# Patient Record
(demographics unavailable — no encounter records)

---

## 2024-12-11 NOTE — HISTORY OF PRESENT ILLNESS
[FreeTextEntry1] : 2/11/22: 66 yo female PMHx HTN, HLD, PAD with severe right leg claudication and early right leg rest pain. Patient had bilateral LE angiograms with intervention recently. She is still unable to walk more than a few feet without severe pain in right leg. Over the past few days she has had significant pain in her right 1st toe. She denies any open wounds or discoloration of the toe.  Pt has been taking Pletal and has slightly decreased pain in her leg.   3/4/22: Patient is doing well since the bypass. She has some leg swelling. She has been complaint with medications. She has less leg pain when walking.   4/1/22: Pt still has some swelling in her right lower leg. She has occasional pain in her thigh since surgery. She occasionally also gets an electrical type pain from her foot the the hip. She has been compliant with ASA, Plavix and Pletal. She still has some claudication in her left leg. She notices it more since surgery because her right leg is less painful.   4/15/22: Pt is still having left leg pain and claudication. She has less pain and swelling in her right leg since her bypass. She has been compliant with ASA and Plavix. She has been having easy bruising. She would like to schedule a screening colonoscopy but was told she cannot schedule until she can stop Plavix. Pt denies any fever or chills.   6/24/22: Pt is doing well since her angiogram. She has less leg pain when walking.  She has been compliant with ASA and Plavix. She has been having easy bruising. She has been complaint with medications. Pt denies any fever or chills.   11/4/22:  Pt is doing well since her angiogram. She has less leg pain when walking.  She has been compliant with ASA and Plavix. She has been having easy bruising. She has been complaint with medications. Pt denies any fever or chills.   3/10/23:   Pt is doing well since her angiogram. She has less leg pain when walking.  She has been compliant with ASA and Plavix. She has been complaint with medications. Pt denies any fever or chills.   4/21/23: Pt is planning to have a cervical discectomy and needs vascular clearance prior to surgery. She has less leg pain when walking.  She has been compliant with ASA and Plavix. She has been complaint with medications. Pt denies any fever or chills.   9/15/23:   Pt is doing well since last visit. She has less leg pain when walking.  She has been compliant with ASA and Plavix. She has been complaint with medications. Pt denies any fever or chills.   12/13/23:   Pt is doing well since last visit. She has less leg pain when walking.  She has been compliant with ASA and Plavix. She has been complaint with medications. Pt denies any fever or chills.   6/12/24: Pt is doing well since last visit. She has less leg pain when walking.  She has been compliant with ASA and Plavix. She has been complaint with medications. Pt denies any fever or chills.   12/11/24: Pt is doing well since last visit. She has less leg pain when walking.  She has been compliant with ASA and Plavix. She has been complaint with medications. Pt denies any fever or chills.   PSHx: 6/2/22 LLE angiogram with SFA POB  2/24/22 right femoral to popliteal bypass  4/2021 right leg angiogram with SFA stent  2/2021 Left leg angiogram with angioplasty

## 2024-12-11 NOTE — ASSESSMENT
[Arterial/Venous Disease] : arterial/venous disease [FreeTextEntry1] : 67 yo female with PAD. She is s/p right femoral to popliteal bypass and left SFA stent. Bypass patent on prior duplex. Left SFA occluded. KULDEEP/PVR today is right 1.09 left 0.70 No changes in symptoms since last visit.  Pt counseled again on results of KULDEEP/PVR and above diagnosis. Pt counseled on importance of compliance with Plavix, and atorvastatin. She can stop aspirin  Walk 30 minutes per day RTC in 6 months for KULDEEP/PVR  A total of 20 minutes was spent with patient and coordinating care.

## 2024-12-11 NOTE — ADDENDUM
[FreeTextEntry1] : patient is cleared from a vascular standpoint to stop ASA and Plavix 5 days prior to colonoscopy. Can resume the next day.

## 2024-12-11 NOTE — PROCEDURE
[FreeTextEntry1] : 11/4/22 BLE arterial duplex:  right: patent femoral to popliteal bypass. No flow restrictive lesions  left: SFA stent occluded   3/10/23 KULDEEP/PVR: right 0.99, left 0.53  12/13/23 KULDEEP/PVR: right 1.14, left 0.71  6/12/24 KULDEEP/PVR: right 0.97, left 0.64  12/11/24 KULDEEP/PVR: right 1.09, left 0.70

## 2024-12-11 NOTE — PHYSICAL EXAM
[Normal Breath Sounds] : Normal breath sounds [Normal Heart Sounds] : normal heart sounds [2+] : right 2+ [0] : left 0 [Ankle Swelling (On Exam)] : present [Ankle Swelling On The Right] : of the right ankle [Alert] : alert [Oriented to Person] : oriented to person [Oriented to Place] : oriented to place [Oriented to Time] : oriented to time [Calm] : calm [JVD] : no jugular venous distention  [Varicose Veins Of Lower Extremities] : not present [] : not present [Skin Ulcer] : no ulcer [de-identified] : A & O x 3  [de-identified] : no open wounds on feet

## 2025-01-23 NOTE — HISTORY OF PRESENT ILLNESS
[Former] : Former [TextBox_13] : Ms. JIMENEZ is a 68 year old female with a history of PAD, HTN and high cholesterol.  Reviewed and confirmed that the patient meets screening eligibility criteria:  68 years old   Smoking Status: Former smoker  Number of pack(s) per day: 1 Number of years smoked: 30 Number of pack years smokin Quit year:   No symptoms of lung cancer, including new cough, change in cough, hemoptysis, and unintentional weight loss.  No personal history of lung cancer.  No lung cancer in a first degree relative.  No history of lung disease or occupational exposures. [YearQuit] : 2019 [PacksperDay] : 1 [N_Years] : 30 [PacksperYear] : 30

## 2025-02-26 NOTE — PROCEDURE
[FreeTextEntry1] : [place] done today 7/26/24: normal   PFT done 4/14/23: no obstruction. lung volumes normal. DLCO normal.  --------- EXAM: [id number] - CT LDCT LUNG CA SCREENING - ORDERED BY: JIM STRONG   PROCEDURE DATE: 02/12/2025    INTERPRETATION: SMOKING HISTORY: 68 year old with 30 pack year smoking history; quit smoking 2019  TECHNIQUE: Low-dose CT scan of the chest was obtained without administration of intravenous contrast.  COMPARISON: Chest CT 2/16/2024  FINDINGS:  LUNG NODULES: Tiny left lower lobe calcified granuloma. No new nodules.  LUNG PARENCHYMA/AIRWAYS/PLEURA: The central airways are patent. No pleural effusion. Unchanged linear atelectasis/scarring in the medial right middle lobe.  MEDIASTINUM/LYMPH NODES: No thoracic lymphadenopathy.  HEART AND GREAT VESSELS: The heart is normal in size. There is no pericardial effusion. The great vessels are normal in size. Coronary artery calcification.  UPPER ABDOMEN: The low dose technique limits diagnostic evaluation of the organs of the upper abdomen: Stable small left renal cyst.  BONES/SOFT TISSUES: ACDF.  IMPRESSION: Stable exam. No noncalcified nodules.  SCREENING RECOMMENDATIONS: 12-month screening LDCT, if the patient continues to meet eligibility criteria  LungRADS category: 1 Negative  Reported using the American College of Radiology Lung Imaging Reporting and Data System (Lung-RADS) version 2022  --- End of Report ---       RHIANNON DECKER M.D., Attending Radiologist  ------- Date of Exam: 02-   EXAM:  CT CHEST WITHOUT CONTRAST  Note - This patient has received 0 CT studies and 0 Myocardial Perfusion studies within our network over the previous 12 month period.  HISTORY:  Persistent cough and bronchitis  TECHNIQUE: CT of the chest is performed.  Contrast Technique: Without  Range/Planes: [place] of lungs to the adrenal glands. Axial, coronal, and sagittal.  One or more of the following dose reduction techniques were used: automated exposure control, adjustment of the mA and/or kV according to patient size, use of iterative reconstruction technique.   COMPARISON:  CT scans of the chest dated 5/3/2022 and 9/24/2019.l  FINDINGS: CHEST  THYROID: Stable comparable contours of the thyroid. No suspicious lymph nodes in the lower neck.  LUNGS: A punctate nodule in the peripheral right middle lobe on series 2/image 137 and a punctate nodule in the lateral left lung base on image 199 are likely stable taking into account respiratory motion. A punctate nodule in the anterior medial left upper lobe on image 77 was not definitely present on the prior examinations. There is no suspiciously enlarged or abnormal appearing nodule in the rest of the lung fields.  No significant emphysematous or interstitial fibrotic lung disease.  TRACHEA AND BRONCHI: No central endobronchial or endotracheal soft tissue mass.  PLEURA: No pleural effusion.  HEART AND PERICARDIUM: Unchanged in size. No pericardial effusion.  VASCULATURE: No aneurysm dilatation.  LYMPH NODES AND MEDIASTINUM: No suspicious lymph nodes in the chest. No mediastinal mass.  SOFT TISSUES: No chest wall mass lesion.  BONES: Progressing degenerative changes especially in the midthoracic spine.  FINDINGS: VISUALIZED PORTION OF THE ABDOMEN   GASTROESOPHAGEAL JUNCTION: Unchanged.  OTHER ORGANS: Stable comparable contours of the solid organs.  IMPRESSION:    1. Minimal progressing architectural changes. Some of currently seen punctate nodules were present dating back to 2019, however, at least one punctate nodule in the anterior left upper lobe was not definitely seen. Given their size and appearance, a benign etiology is favored. If clinically amenable, consider routine annual screening.  2. No suspicious lymph nodes in the chest.   DLP: 89 mGy*cm  Thank you for the opportunity to participate in the care of this patient.     Chris Salinas MD  - Electronically Signed: 02- 3:20 PM  Physician to Physician Direct Line is: [phone]

## 2025-02-26 NOTE — HISTORY OF PRESENT ILLNESS
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  Doing well on APAP 6-16. Got current machine 12/18/21. Compliance excellent. Therapeutic AHI WNL. Fels better using it. Has to skip some nights with URI.   Right now, no sob, wheeze, cough. Feels well.  Does not have an albuterol now.  [place] done 7/26/24: normal   Quit smoking in 2019. [TextBox_100] : 3/27/22 [TextBox_108] : 123 [TextBox_104] : 5/8/22 [TextBox_125] : 6-16 [TextBox_137] : 70 [TextBox_147] : 2.4

## 2025-03-28 NOTE — REASON FOR VISIT
[Follow-Up Visit] : a follow-up visit for [Back Pain] : back pain [FreeTextEntry2] : s/p: cervical spinal fusion C3-C6. DOS: 05/31/2023

## 2025-03-28 NOTE — HISTORY OF PRESENT ILLNESS
[de-identified] : 69-year-old female is here for neck and back pain.  She is status post cervical fusion C3-C6 on May 1, 2023.  She has slight discomfort at the base of her neck intermittently/muscle spasm type quality when she is particularly active but is not having any weakness of the arms or legs.  Her balance is much better after hiring a  and swimming  once to twice a week for the last several months.  She also has intermittent tingling in the first and second digits on the right hand intermittently worse with being on the phone or driving which I think is more related to carpal tunnel syndrome.  She saw her neurologist yesterday who diagnosed her with carpal tunnel syndrome as well as osteoarthritis of the lumbar spine.  She has low back pain upon week, but it gets better with as the day progresses.  No weakness numbness or tingling of the legs.

## 2025-03-28 NOTE — DISCUSSION/SUMMARY
[de-identified] : 30 minutes was spent reviewing the x-rays as well as discussing with the patient their clinical presentation, diagnosis and providing education.  Conservative treatment was discussed with the patient at length. Anticipatory guidance regarding disease process status post ACDF, cervical myositis and, avoidance of acute exacerbation this was discussed at length and all patients commenting concerns were answered to the patient's satisfaction. Physical therapy for decrease pain and increase function was ordered. Patient was given home exercises as approved by [employer] and works well held directed toward this particular process. Intermittent use of acetaminophen 500 mg 2 tablets t.i.d. p.r.n. mild to moderate pain, ibuprofen 600 mg t.i.d. p.r.n. severe pain with food or milk. Home exercise including stretching on a daily basis for 20-30 minutes was recommended. Heat, ice, topical were discussed as needed. The patient will followup in 6 months or as needed at which point in time if symptoms continue we will order lumbar MRI studies to guide treatment plan including possible injection therapy with pain management versus surgical option.   Regarding carpal tunnel/medial cubital tunnel syndrome, if patient would like we can order PT OT in the future.  She will do home exercises for the time being.  We can also refer her to hand upper extremity specialty as needed.

## 2025-03-28 NOTE — PHYSICAL EXAM
[de-identified] : CONSTITUTIONAL:  Patient is a very pleasant individual who is well-nourished and appears stated age.  PSYCHIATRIC:  Alert and oriented times three and in no apparent distress, and participates with orthopedic evaluation well. HEAD:  Atraumatic and  nonsyndromic in appearance. EENT: No thyromegaly, EOMI. RESPIRATORY:  Respiratory rate is regular, not dyspneic on examination. LYMPHATICS:  There is no cervical or axillary lymphadenopathy. INTEGUMENTARY:  Skin is clean, dry, and intact about the bilateral upper extremities and cervical spine.  Well-healed anterior cervical incision VASCULAR:   There is brisk capillary refill about the bilateral upper extremities and radial pulses are 2/4.  NEUROLOGIC:  Negative L'hirmitte, negative Spurling's sign. There are no pathologic reflexes. There is no decrease in sensation of the bilateral upper extremities on manual examination.  Deep tendon reflexes are well-maintained at +2/4 of the bilateral upper extremities and are symmetric.  Positive Phalen Tinel sign right greater than left positive numbness on percussion of ulnar nerve right greater than left. MUSCULOSKELETAL:  There is no visible muscular atrophy.  Manual motor strength is well maintained in the bilateral upper extremities.  Cervical range of motion is well maintained.  The patient ambulates in a non-myelopathic manner. Normal secondary orthopaedic exam of bilateral shoulders, elbows and hands.  Elbow flexion and extension, wrist extension, finger flexion and abduction are well maintained.  There is mild pain on forward flexion of the lumbar spine with mild trapezius deltoid myositis.    [de-identified] : X-rays of the cervical spine have been reviewed on today's date that shows status post 3 level ACDF well aligned when compared to x-rays from May 24 and stable.  Have also been reviewed shows loss of disc height at L3-L4 lumbar lordosis is maintained gentle sidebending to the left orientation

## 2025-05-02 NOTE — PHYSICAL EXAM
[FreeTextEntry1] : normal peripheral circulation  [Normal Appearance] : normal appearance [Well Groomed] : well groomed [General Appearance - In No Acute Distress] : no acute distress [] : no respiratory distress [Respiration, Rhythm And Depth] : normal respiratory rhythm and effort [Exaggerated Use Of Accessory Muscles For Inspiration] : no accessory muscle use [Abdomen Soft] : soft [Abdomen Tenderness] : non-tender [Urinary Bladder Findings] : the bladder was normal on palpation [Normal Station and Gait] : the gait and station were normal for the patient's age [No Focal Deficits] : no focal deficits [Oriented To Time, Place, And Person] : oriented to person, place, and time [Affect] : the affect was normal [Mood] : the mood was normal

## 2025-05-02 NOTE — ASSESSMENT
[FreeTextEntry1] : Reviewed Records. Discussed labs and imaging. Reviewed records. Patient had CT scan of the abdomen and pelvis at [facility]. Shows bilateral renal cysts. On review of images, renal cysts appeared unchanged.    Renal cyst: Patient will get renal and bladder ultrasound in 1 year. Will inform results.     Return to office in 1 year or sooner if any issues.

## 2025-05-02 NOTE — LETTER BODY
[Dear  ___] : Dear  [unfilled], [Courtesy Letter:] : I had the pleasure of seeing your patient, [unfilled], in my office today. [Please see my note below.] : Please see my note below. [Sincerely,] : Sincerely, [FreeTextEntry3] : Kg Garcia MD\par   of Urology\par  [facility]\par  \par  [facility]\par  Offices:\par  [address] [facility], [facility]\par  [address] [address]\par  [address] [facility], [address]\par  \par  TEL: [phone]\par  FAX: [phone]

## 2025-05-02 NOTE — HISTORY OF PRESENT ILLNESS
[FreeTextEntry1] : Primary care physician: Dr Abbey Azul, [facility].   Follow my health: Active user.   05/02/2025: 69-year-old female presents for follow up.  Repeat RBUS 11/04/24 again showed bilateral simple cyst. Repeat CT 05/01/2025 again showed bilateral simple renal cysts. No bother with urination.  Denies dysuria, hematuria, lower abdominal or flank pain, fever, chills or rigors. Denies hesitancy, straining, intermittency, urgency, incontinence or sense of incomplete emptying.  05/01/2024:  68-year-old female presents for follow-up In March, patient fell down and went to [facility]. Had a pan-scan.   Since then, has been having back pain, especially on movement. No bother with urination. No other imaging was done.   Seen on 3/8/23 for renal cyst. Had been having right upper quadrant pain, had Abdominal Ultrasound: right renal cyst.  Also complained of right back pain. Has Spine issues. Daytime frequency is 2 to 3 x or so. Nocturia of 3 x.  No urinary incontinence.  Denied dysuria, hematuria, fever, chills or rigors. No personal history or family history of kidney stone.   Mother required Dialysis.   Past smoker. 1 PPD >30 years. Quit 2019.   Seen in 2019 for Microhematuria. Had negative work up including Cystoscopy.

## 2025-05-02 NOTE — ADDENDUM
[FreeTextEntry1] :  Sarah KATE assisted in documentation on 05/01/2024  acting as a scribe for Dr. Kg Garcia.

## 2025-05-02 NOTE — END OF VISIT
[Time Spent: ___ minutes] : I have spent [unfilled] minutes of time on the encounter which excludes teaching and separately reported services. [FreeTextEntry3] : Patient was seen with Nurse Practitioner and examined by me. Agree with above note, where necessary edits were made.

## 2025-05-28 NOTE — HISTORY OF PRESENT ILLNESS
[FreeTextEntry1] : Leticia is a very pleasant 69-year-old female presenting today with chronic history of wrist and hand discomfort.  Describes burning and numbness and tingling.

## 2025-05-28 NOTE — REASON FOR VISIT
[Initial Visit] : an initial visit for [Wrist Pain] : wrist pain [Carpal Tunnel Syndrome] : Carpal Tunnel Syndrome

## 2025-05-28 NOTE — ASSESSMENT
[FreeTextEntry1] : ASSESSMENT: The patient comes in today with chronic exacerbated complaints of left wrist and hand discomfort.  We have discussed carpal tunnel symptoms.  We have discussed bracing activity modification.  At this stage patient elects for injection which should be helpful in a therapeutic and diagnostic manner   The patient was adequately and thoroughly informed of my assessment of their current condition(s).  - This may diminish bodily function for the extremity. We discussed prognosis, tx modalities including operative and nonoperative options for the above diagnostic assessment. As always, 2nd opinion is always provided as an option.  When accessible, I was able to review other physicians note(s) including reviewing other tests, imaging results as well as personally view these results for my own interpretation.   Injection procedure for left carpal tunnel:   The risks and benefits of a steroid injection were discussed in detail. The risks include but are not limited to: pain, infection, swelling, flare response, bleeding, subcutaneous fat atrophy, skin depigmentation and/or elevation of blood sugar. The risk of incomplete resolution of symptoms, recurrence and additional intervention was reviewed and considered by the patient. The patient agreed to proceed and under a sterile prep, I injected 1 unit 6mg into 1 cc of a combination of Celestone and Lidocaine into the above stated location for the procedure. The patient tolerated the injection well. The patient was adequately and thoroughly informed of my assessment of their current condition(s).  DISCUSSION: 1.  Injection as above.  Bracing activity modification.  Follow-up in 6 weeks 2. [x] 3. [x]

## 2025-05-28 NOTE — PHYSICAL EXAM
[de-identified] : Exam [left] wrist + Durkan's with + N/T. There is + tinel. Patient is able to delineate numbness to median-sided digits volarly. [No obvious thenar atrophy] [de-identified] : [4] views of [bilateral hands and wrists] were obtained today in my office and were seen by me and discussed with the patient.  These [show findings consistent with bilateral basal joint OA and findings of IP joint OA]

## 2025-06-11 NOTE — PROCEDURE
[FreeTextEntry1] : 11/4/22 BLE arterial duplex:  right: patent femoral to popliteal bypass. No flow restrictive lesions  left: SFA stent occluded   3/10/23 KULDEEP/PVR: right 0.99, left 0.53  12/13/23 KULDEEP/PVR: right 1.14, left 0.71  6/12/24 KULDEEP/PVR: right 0.97, left 0.64  12/11/24 KULDEEP/PVR: right 1.09, left 0.70  6/11/25 KULDEEP/PVR: right 0.99, left 0.69

## 2025-06-11 NOTE — HISTORY OF PRESENT ILLNESS
[FreeTextEntry1] : 2/11/22: 64 yo female PMHx HTN, HLD, PAD with severe right leg claudication and early right leg rest pain. Patient had bilateral LE angiograms with intervention recently. She is still unable to walk more than a few feet without severe pain in right leg. Over the past few days she has had significant pain in her right 1st toe. She denies any open wounds or discoloration of the toe.  Pt has been taking Pletal and has slightly decreased pain in her leg.   3/4/22: Patient is doing well since the bypass. She has some leg swelling. She has been complaint with medications. She has less leg pain when walking.   4/1/22: Pt still has some swelling in her right lower leg. She has occasional pain in her thigh since surgery. She occasionally also gets an electrical type pain from her foot the the hip. She has been compliant with ASA, Plavix and Pletal. She still has some claudication in her left leg. She notices it more since surgery because her right leg is less painful.   4/15/22: Pt is still having left leg pain and claudication. She has less pain and swelling in her right leg since her bypass. She has been compliant with ASA and Plavix. She has been having easy bruising. She would like to schedule a screening colonoscopy but was told she cannot schedule until she can stop Plavix. Pt denies any fever or chills.   6/24/22: Pt is doing well since her angiogram. She has less leg pain when walking.  She has been compliant with ASA and Plavix. She has been having easy bruising. She has been complaint with medications. Pt denies any fever or chills.   11/4/22:  Pt is doing well since her angiogram. She has less leg pain when walking.  She has been compliant with ASA and Plavix. She has been having easy bruising. She has been complaint with medications. Pt denies any fever or chills.   3/10/23:   Pt is doing well since her angiogram. She has less leg pain when walking.  She has been compliant with ASA and Plavix. She has been complaint with medications. Pt denies any fever or chills.   4/21/23: Pt is planning to have a cervical discectomy and needs vascular clearance prior to surgery. She has less leg pain when walking.  She has been compliant with ASA and Plavix. She has been complaint with medications. Pt denies any fever or chills.   9/15/23:   Pt is doing well since last visit. She has less leg pain when walking.  She has been compliant with ASA and Plavix. She has been complaint with medications. Pt denies any fever or chills.   12/13/23:   Pt is doing well since last visit. She has less leg pain when walking.  She has been compliant with ASA and Plavix. She has been complaint with medications. Pt denies any fever or chills.   6/12/24: Pt is doing well since last visit. She has less leg pain when walking.  She has been compliant with ASA and Plavix. She has been complaint with medications. Pt denies any fever or chills.   12/11/24: Pt is doing well since last visit. She has less leg pain when walking.  She has been compliant with ASA and Plavix. She has been complaint with medications. Pt denies any fever or chills.   6/11/25: Pt is doing well since last visit. She has less leg pain when walking.  She has been compliant with ASA and Plavix. She has been complaint with medications. Pt denies any fever or chills.   PSHx: 6/2/22 LLE angiogram with SFA POB  2/24/22 right femoral to popliteal bypass  4/2021 right leg angiogram with SFA stent  2/2021 Left leg angiogram with angioplasty

## 2025-06-11 NOTE — ASSESSMENT
[FreeTextEntry1] : 70 yo female with PAD. She is s/p right femoral to popliteal bypass and left SFA stent. Bypass patent on prior duplex. Left SFA occluded. KULDEEP/PVR today is right 0.99, left 0.69. No changes in symptoms since last visit.  Pt counseled on results of KULDEEP/PVR and above diagnosis. Pt counseled on importance of compliance with Plavix, and atorvastatin. She can stop aspirin  Walk 30 minutes per day RTC in 6 months for KULDEEP/PVR   [Arterial/Venous Disease] : arterial/venous disease

## 2025-06-11 NOTE — PHYSICAL EXAM
[JVD] : no jugular venous distention  [Normal Breath Sounds] : Normal breath sounds [Normal Heart Sounds] : normal heart sounds [0] : left 0 [2+] : right 2+ [Ankle Swelling (On Exam)] : present [Ankle Swelling On The Right] : of the right ankle [Varicose Veins Of Lower Extremities] : not present [] : not present [Skin Ulcer] : no ulcer [Alert] : alert [Oriented to Place] : oriented to place [Oriented to Person] : oriented to person [Oriented to Time] : oriented to time [Calm] : calm [de-identified] : A & O x 3  [de-identified] : no open wounds on feet

## 2025-07-09 NOTE — HISTORY OF PRESENT ILLNESS
[FreeTextEntry1] : Leticia is a very pleasant 69-year-old female presenting today with chronic history of wrist and hand discomfort.  Describes burning and numbness and tingling. Presents today recent nerve study.  She has history of cervical spine surgery.

## 2025-07-09 NOTE — PHYSICAL EXAM
[de-identified] : Patient is able to delineate numbness to median-sided digits volarly. [No obvious thenar atrophy] [de-identified] : [4] views of [bilateral hands and wrists] were reviewed today in my office and were seen by me and discussed with the patient.  These [show findings consistent with bilateral basal joint OA and findings of IP joint OA]

## 2025-07-09 NOTE — ASSESSMENT
[FreeTextEntry1] : ASSESSMENT: The patient comes in today with chronic exacerbated complaints of left wrist and hand discomfort.  We have discussed carpal tunnel symptoms.  We have discussed bracing activity modification.   Unfortunately the response to the injection is unclear as per patient.  At this juncture we have also thoroughly reviewed and reviewed the patient's nerve study findings.  The patient has findings related to cervical spine as well as per neurologist and findings carpal tunnel changes.  At this juncture the study alludes to bilateral carpal tunnel.  The patient has no findings on right side and denies numbness and tingling.  On the left side patient alludes to carpal tunnel type symptoms however response to the injection is unclear which leads to possibility of symptoms coming from the cervical spine unfortunately.  At this stage patient elects for injection once again.  If no improvement with injections I have advised patient that surgery is of limited benefit.  It may not lead to any change in her clinical status.  She verbalized complete understanding   The patient was adequately and thoroughly informed of my assessment of their current condition(s).  - This may diminish bodily function for the extremity. We discussed prognosis, tx modalities including operative and nonoperative options for the above diagnostic assessment. As always, 2nd opinion is always provided as an option.  When accessible, I was able to review other physicians note(s) including reviewing other tests, imaging results as well as personally view these results for my own interpretation.   Injection procedure for left carpal tunnel:   The risks and benefits of a steroid injection were discussed in detail. The risks include but are not limited to: pain, infection, swelling, flare response, bleeding, subcutaneous fat atrophy, skin depigmentation and/or elevation of blood sugar. The risk of incomplete resolution of symptoms, recurrence and additional intervention was reviewed and considered by the patient. The patient agreed to proceed and under a sterile prep, I injected 1 unit 6mg into 1 cc of a combination of Celestone and Lidocaine into the above stated location for the procedure. The patient tolerated the injection well. The patient was adequately and thoroughly informed of my assessment of their current condition(s).  DISCUSSION: 1.  Injection as above.  Bracing activity modification.  Follow-up in 1 month.  Unclear response 2.  History of cervical spine surgery. 3.  We have thoroughly reviewed and reviewed the patient's nerve study/EMG findings